# Patient Record
Sex: MALE | URBAN - METROPOLITAN AREA
[De-identification: names, ages, dates, MRNs, and addresses within clinical notes are randomized per-mention and may not be internally consistent; named-entity substitution may affect disease eponyms.]

---

## 2022-06-29 ENCOUNTER — OFFICE VISIT (OUTPATIENT)
Dept: URBAN - METROPOLITAN AREA CLINIC 35 | Facility: CLINIC | Age: 67
End: 2022-06-29
Payer: COMMERCIAL

## 2022-06-29 VITALS
SYSTOLIC BLOOD PRESSURE: 140 MMHG | HEIGHT: 76 IN | HEART RATE: 67 BPM | WEIGHT: 250 LBS | OXYGEN SATURATION: 94 % | DIASTOLIC BLOOD PRESSURE: 82 MMHG | BODY MASS INDEX: 30.44 KG/M2

## 2022-06-29 DIAGNOSIS — Z79.1 NSAID LONG-TERM USE: ICD-10-CM

## 2022-06-29 DIAGNOSIS — Z12.11 COLON CANCER SCREENING: ICD-10-CM

## 2022-06-29 DIAGNOSIS — K92.1 MELENA: ICD-10-CM

## 2022-06-29 DIAGNOSIS — K25.9 GASTRIC ULCER WITHOUT HEMORRHAGE OR PERFORATION, UNSPECIFIED CHRONICITY: ICD-10-CM

## 2022-06-29 DIAGNOSIS — R12 HEARTBURN: ICD-10-CM

## 2022-06-29 PROCEDURE — 99204 OFFICE O/P NEW MOD 45 MIN: CPT | Performed by: PHYSICIAN ASSISTANT

## 2022-06-29 RX ORDER — PANTOPRAZOLE SODIUM 40 MG/1
1 TABLET TABLET, DELAYED RELEASE ORAL TWICE A DAY
Qty: 60 | Refills: 3 | OUTPATIENT
Start: 2022-06-29

## 2022-06-29 RX ORDER — LOSARTAN POTASSIUM 100 MG/1
1 TABLET TABLET ORAL ONCE A DAY
Status: ACTIVE | COMMUNITY

## 2022-06-29 RX ORDER — DESVENLAFAXINE 100 MG/1
1 TABLET TABLET, FILM COATED, EXTENDED RELEASE ORAL ONCE A DAY
Status: ACTIVE | COMMUNITY

## 2022-06-29 RX ORDER — DICYCLOMINE HYDROCHLORIDE 20 MG/1
1 TABLET TABLET ORAL THREE TIMES A DAY
Status: ACTIVE | COMMUNITY

## 2022-06-29 RX ORDER — CLOTRIMAZOLE 10 MG/G
1 APPLICATION CREAM TOPICAL TWICE A DAY
Status: ACTIVE | COMMUNITY

## 2022-06-29 RX ORDER — SODIUM, POTASSIUM,MAG SULFATES 17.5-3.13G
177ML SOLUTION, RECONSTITUTED, ORAL ORAL
Qty: 1 | Refills: 0 | OUTPATIENT
Start: 2022-06-29 | End: 2022-07-01

## 2022-06-29 RX ORDER — TOFACITINIB 11 MG/1
1 TABLET TABLET, FILM COATED, EXTENDED RELEASE ORAL ONCE A DAY
Status: ACTIVE | COMMUNITY

## 2022-06-29 RX ORDER — FINASTERIDE 5 MG/1
1 TABLET TABLET, FILM COATED ORAL ONCE A DAY
Status: ACTIVE | COMMUNITY

## 2022-06-29 NOTE — HPI-COLONOSCOPY SCREENING
66 y/o male patient presents today for a consultation for a colorectal cancer screening. Patient denies this is his first colonoscopy due to age. Patient currently admits 2-3 bowel movements per day with melena but denies mucus, or blood in stools. Patient denies abdominal pain, bloating, gas. Patient admits to heartburn depending on his diet.   Patient admits having a colonoscopy in the past with no findings. Patient denies a family hx of colon, gastric, or esophageal cancer/polyps.

## 2022-06-29 NOTE — HPI-MELENA
66 y/o male patient presents today for a consultation on melena. Symptoms started beginning of 2022. Patient states his stool is green color with black. Patient denies any associated symptoms of mucus, pruritus ani, or rectal pain but admits to melena. Patient currently admits 2-3 bowel movements per day with melena but denies mucus, or blood in stools.   Patient admits experiencing fecal urgency but denies difficulty having a bowel movement.   He will occasionally have diarrhea.  Patient denies any associated symptoms of abdominal cramping/pain, bloating, gas, but admits minor heartburn. Patient admits to taking OTC vegetable laxative with some relief of symptoms of constipation.  Patient admits having a colonoscopy in the past ten years ago with no findings.   Patient is unsure of having EGD in the past. Patient denies a family hx of colon, gastric, or esophageal cancer/polyps. normal...

## 2022-06-29 NOTE — HPI-GASTRIC ULCER
Patient states he was told he had a gastric ulcer 4 years ago in the ER at [facility].  He states he had an imaging study and was told that was his issue.  He admits to taking ibuprofen 4 pills at night, 8 in the afternoon, and 4 pills in the morning.  He does not take any reflux medication.  He takes the NSAIDS for sciatic pain and RA.

## 2022-07-20 ENCOUNTER — CLAIMS CREATED FROM THE CLAIM WINDOW (OUTPATIENT)
Dept: URBAN - METROPOLITAN AREA SURGERY CENTER 8 | Facility: SURGERY CENTER | Age: 67
End: 2022-07-20

## 2022-07-20 ENCOUNTER — CLAIMS CREATED FROM THE CLAIM WINDOW (OUTPATIENT)
Dept: URBAN - METROPOLITAN AREA SURGERY CENTER 8 | Facility: SURGERY CENTER | Age: 67
End: 2022-07-20
Payer: COMMERCIAL

## 2022-07-20 DIAGNOSIS — D12.2 ADENOMA OF ASCENDING COLON: ICD-10-CM

## 2022-07-20 DIAGNOSIS — K92.1 ACUTE MELENA: ICD-10-CM

## 2022-07-20 DIAGNOSIS — Z12.11 COLON CANCER SCREENING: ICD-10-CM

## 2022-07-20 DIAGNOSIS — K63.89 BACTERIAL OVERGROWTH SYNDROME: ICD-10-CM

## 2022-07-20 DIAGNOSIS — K25.9 ANTRAL ULCER: ICD-10-CM

## 2022-07-20 DIAGNOSIS — K31.89 ACQUIRED DEFORMITY OF DUODENUM: ICD-10-CM

## 2022-07-20 PROCEDURE — 43239 EGD BIOPSY SINGLE/MULTIPLE: CPT | Performed by: INTERNAL MEDICINE

## 2022-07-20 PROCEDURE — G8907 PT DOC NO EVENTS ON DISCHARG: HCPCS | Performed by: INTERNAL MEDICINE

## 2022-07-20 PROCEDURE — 45385 COLONOSCOPY W/LESION REMOVAL: CPT | Performed by: INTERNAL MEDICINE

## 2022-07-28 ENCOUNTER — TELEPHONE ENCOUNTER (OUTPATIENT)
Dept: URBAN - METROPOLITAN AREA CLINIC 35 | Facility: CLINIC | Age: 67
End: 2022-07-28

## 2022-07-28 LAB
HEMATOCRIT: 41
HEMOGLOBIN: 13.6
MCH: 30.4
MCHC: 33.2
MCV: 92
NRBC: (no result)
PLATELETS: 230
RBC: 4.47
RDW: 13.3
WBC: 4.6

## 2022-08-05 ENCOUNTER — WEB ENCOUNTER (OUTPATIENT)
Dept: URBAN - METROPOLITAN AREA CLINIC 35 | Facility: CLINIC | Age: 67
End: 2022-08-05

## 2022-08-10 ENCOUNTER — OFFICE VISIT (OUTPATIENT)
Dept: URBAN - METROPOLITAN AREA CLINIC 35 | Facility: CLINIC | Age: 67
End: 2022-08-10
Payer: COMMERCIAL

## 2022-08-10 ENCOUNTER — DASHBOARD ENCOUNTERS (OUTPATIENT)
Age: 67
End: 2022-08-10

## 2022-08-10 VITALS
HEIGHT: 64 IN | WEIGHT: 247 LBS | OXYGEN SATURATION: 96 % | BODY MASS INDEX: 42.17 KG/M2 | DIASTOLIC BLOOD PRESSURE: 74 MMHG | SYSTOLIC BLOOD PRESSURE: 118 MMHG | HEART RATE: 82 BPM

## 2022-08-10 DIAGNOSIS — K64.0 GRADE I HEMORRHOIDS: ICD-10-CM

## 2022-08-10 DIAGNOSIS — D12.3 BENIGN NEOPLASM OF TRANSVERSE COLON: ICD-10-CM

## 2022-08-10 DIAGNOSIS — R12 HEARTBURN: ICD-10-CM

## 2022-08-10 DIAGNOSIS — K44.9 HIATAL HERNIA: ICD-10-CM

## 2022-08-10 DIAGNOSIS — D12.2 ADENOMA OF ASCENDING COLON: ICD-10-CM

## 2022-08-10 DIAGNOSIS — Z79.1 NSAID LONG-TERM USE: ICD-10-CM

## 2022-08-10 DIAGNOSIS — K25.9 GASTRIC ULCER WITHOUT HEMORRHAGE OR PERFORATION, UNSPECIFIED CHRONICITY: ICD-10-CM

## 2022-08-10 PROBLEM — 73481001: Status: ACTIVE | Noted: 2022-06-29

## 2022-08-10 PROCEDURE — 99214 OFFICE O/P EST MOD 30 MIN: CPT | Performed by: PHYSICIAN ASSISTANT

## 2022-08-10 RX ORDER — FINASTERIDE 5 MG/1
1 TABLET TABLET, FILM COATED ORAL ONCE A DAY
Status: ACTIVE | COMMUNITY

## 2022-08-10 RX ORDER — DESVENLAFAXINE 100 MG/1
1 TABLET TABLET, FILM COATED, EXTENDED RELEASE ORAL ONCE A DAY
Status: ACTIVE | COMMUNITY

## 2022-08-10 RX ORDER — TOFACITINIB 11 MG/1
1 TABLET TABLET, FILM COATED, EXTENDED RELEASE ORAL ONCE A DAY
Status: ACTIVE | COMMUNITY

## 2022-08-10 RX ORDER — LOSARTAN POTASSIUM 100 MG/1
1 TABLET TABLET ORAL ONCE A DAY
Status: ACTIVE | COMMUNITY

## 2022-08-10 RX ORDER — PANTOPRAZOLE SODIUM 40 MG/1
1 TABLET TABLET, DELAYED RELEASE ORAL TWICE A DAY
Qty: 60 | Refills: 3 | Status: ACTIVE | COMMUNITY
Start: 2022-06-29

## 2022-08-10 RX ORDER — DICYCLOMINE HYDROCHLORIDE 20 MG/1
1 TABLET TABLET ORAL THREE TIMES A DAY
Status: ACTIVE | COMMUNITY

## 2022-08-10 RX ORDER — CLOTRIMAZOLE 10 MG/G
1 APPLICATION CREAM TOPICAL TWICE A DAY
Status: ACTIVE | COMMUNITY

## 2022-08-10 RX ORDER — ACETAMINOPHEN 325 MG
1 TABLET AS NEEDED TABLET ORAL
Status: ACTIVE | COMMUNITY

## 2022-08-10 NOTE — HPI-MELENA
He denies continued episodes of melena since his last visit.    Last visit (6/29/2022)  66 y/o male patient presents today for a consultation on melena. Symptoms started beginning of 2022. Patient states his stool is green color with black. Patient denies any associated symptoms of mucus, pruritus ani, or rectal pain but admits to melena. Patient currently admits 2-3 bowel movements per day with melena but denies mucus, or blood in stools.   Patient admits experiencing fecal urgency but denies difficulty having a bowel movement.   He will occasionally have diarrhea.  Patient denies any associated symptoms of abdominal cramping/pain, bloating, gas, but admits minor heartburn. Patient admits to taking OTC vegetable laxative with some relief of symptoms of constipation.  Patient admits having a colonoscopy in the past ten years ago with no findings.   Patient is unsure of having EGD in the past. Patient denies a family hx of colon, gastric, or esophageal cancer/polyps.

## 2022-08-10 NOTE — HPI-COLONOSCOPY SCREENING
Patient presents today to review her colonoscopy and endoscopy results. He denies any complications after his procedures. Currently he reports 1-3 bowel movements a day without strain. His stools are formed without blood or mucus present. He denies any rectal pain or pruritus ani.   Since his procedure he denies episodes of dysphagia, globus, a change in appetite or bowel habits.  EGD Findings: (7/20/2022)   - Normal hypopharynx. - 2 cm hiatal hernia. - Z-line irregular, 42 cm from the incisors. - Non-bleeding gastric ulcer with no stigmata of bleeding. - Erythematous mucosa in the stomach - Normal examined duodenum.   EGD Path:  Duodenum, Duodenal Bulb, Second Part - Benign small bowel mucosa with normal villous pattern and focal gastric metaplasia.  - Negative for intraepithelial lymphocytosis.    Colon Findings: (7/20/2022)  - Internal hemorrhoids. - One 5 mm polyp in the distal ascending colon, removed with a cold snare.  Resected and retrieved. - One 3 mm polyp in the proximal transverse colon.  Colon Path:  Colon, Ascending - Tubular Adenoma - Melanosis coli.  Colon, Transverse - Benign colonic mucosa with lymphoid aggregate. - Negative for serrated or adenomatous changes. - Melanosis coli.    Last visit (6/29/2022)  68 y/o male patient presents today for a consultation for a colorectal cancer screening. Patient denies this is his first colonoscopy due to age. Patient currently admits 2-3 bowel movements per day with melena but denies mucus, or blood in stools. Patient denies abdominal pain, bloating, gas. Patient admits to heartburn depending on his diet.   Patient admits having a colonoscopy in the past with no findings. Patient denies a family hx of colon, gastric, or esophageal cancer/polyps.

## 2022-08-10 NOTE — HPI-GASTRIC ULCER
Patient denies any associated symptoms.    Last visit (6/29/2022)  Patient states he was told he had a gastric ulcer 4 years ago in the ER at [facility].  He states he had an imaging study and was told that was his issue.  He admits to taking ibuprofen 4 pills at night, 8 in the afternoon, and 4 pills in the morning.  He does not take any reflux medication.  He takes the NSAIDS for sciatic pain and RA.